# Patient Record
(demographics unavailable — no encounter records)

---

## 2024-10-07 NOTE — ASSESSMENT
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 Patient delivered at [facility] Oct 6, 2020 , upon d/c noted to have elevated BP, followed up with her OB/GYN at [facility] and started on Labetalol which she took for 1 month for treatment of postpartum pre-eclampsia.  TTE 21 with LVEF 15%, mild LAE, severe global LV systolic dysfunction, mod diastolic dysfunction stage II, decreased RV systolic function. Cardiac MRI on  with LVEF 30.55% with no LGE and repeat  with LVEF 41%,  3/24/23 LVEF 40%. Appears euvolemic and normotensive with repeat TTE 4/10/24 LVEF 52% from 40%.

## 2024-10-07 NOTE — CARDIOLOGY SUMMARY
[de-identified] : 4/11/24  NSR 68, RBBB- no change 10/9/23  NSR 68, RBBB- no change 4/3/23 NSR 63, RBBB- no change 10/3/22 NSR 62, RBBB- no change 4/4/22 NSR 79, RBBB 12/6/21 NSR 60, RBBB, NSST 9/2/21 NSR 79, RBBB, NSST 6/3/21 NSR 80, RBBB, NSST   [de-identified] : TTE 4/10/24 with improved LVEF 52%.  3/24/23 TTE; LVEF 40%, no change  12/6/21 TTE; LVEF 44%, LVIDD 4.3 cm, mild to mod LV systolic dysfunction, normal diastolic dysfunction, mild TR  TTE 4/29/21 with LVEF 15%, mild LAE, severe global LV systolic dysfunction, mod diastolic dysfunction stage II, decreased RV systolic function.  12/2020 TTE LVEF 20%, severe LV systolic dysfunction    [de-identified] : 8/9/21 with LVEF 41% with no segmental wall abn or perfusion abn, no LGE to suggest scar, ischemia or fibrosis, RVEF 52%\par  \par  Cardiac MRI on 4//21 with LVEF 30.55% with no LGE \par   \par  \par

## 2024-10-07 NOTE — PHYSICAL EXAM
[Well Nourished] : well nourished [No Acute Distress] : no acute distress [Normal Conjunctiva] : normal conjunctiva [Normal Venous Pressure] : normal venous pressure [Normal S1, S2] : normal S1, S2 [Clear Lung Fields] : clear lung fields [Good Air Entry] : good air entry [Soft] : abdomen soft [Non Tender] : non-tender [Normal Gait] : normal gait [No Edema] : no edema [No Rash] : no rash [Normal] : alert and oriented, normal memory [de-identified] : JVP < 6 cm [de-identified] : warm and well perfused

## 2024-10-07 NOTE — DISCUSSION/SUMMARY
[Patient] : the patient [___ Month(s)] : in [unfilled] month(s) [EKG obtained to assist in diagnosis and management of assessed problem(s)] : EKG obtained to assist in diagnosis and management of assessed problem(s) [FreeTextEntry1] : 1. 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 - Appears euvolemic - Continue Metoprolol  100 mg bid - May take furosemide  to 20 mg as needed for weight gain of 2-3 lbs in 2-3 days from daily- pt has not taken in a long time  - Continue enalapril 20 mg bid . - she will call when she stops breast feeding and will consider changing enalapril to Entresto - Previously Stopped Anticoagulation last visit, no thrombus on TTE - Pt does not require an ICD due to improved LVEF - will remain on current therapy, at least until LVEF normalizes  Repeat Echo April 2025

## 2024-10-07 NOTE — PHYSICAL EXAM
[Well Nourished] : well nourished [No Acute Distress] : no acute distress [Normal Conjunctiva] : normal conjunctiva [Normal Venous Pressure] : normal venous pressure [Normal S1, S2] : normal S1, S2 [Clear Lung Fields] : clear lung fields [Good Air Entry] : good air entry [Soft] : abdomen soft [Non Tender] : non-tender [Normal Gait] : normal gait [No Edema] : no edema [No Rash] : no rash [Normal] : alert and oriented, normal memory [de-identified] : JVP < 6 cm [de-identified] : warm and well perfused

## 2024-10-07 NOTE — HISTORY OF PRESENT ILLNESS
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 Presented to the ED  20 c/o shortness of breath, orthopnea, and palpitations since . Patient delivered at [facility] Oct 6, 2020 , upon d/c noted to have elevated BP, followed up with her OB/GYN at [facility] and started on Labetalol which she took for 1 month for treatment of postpartum pre-eclampsia.  TTE 21 with LVEF 15%, mild LAE, severe global LV systolic dysfunction, mod diastolic dysfunction stage II, decreased RV systolic function. Cardiac MRI on  with LVEF 30.55% with no LGE and TTE 4/10/24 with improved LVEF 52%. Pt is here for a follow up .   Pt is here for a follow up . Her son Randal is 3 years old .and her daughter is 6 years old. TTE 4/10/24 with improved LVEF 52%.   Currently, pt states she is feeling well. Was on enalapril 20 mg bid ^ nursing her 3 year old and had not change to Entresto She is taking metoprolol 100 mg bid and never takes furosemide.    Her walking is not limited by shortness of breath. She can climb 2 flights of stairs without dyspnea. She sleeps with 2 pillows with no orthopnea. No episodes of syncope.   She works n [employer] commuting into [place] two days a week and works remotely three days a week.   Denies chest pain, palpitations, dizziness/LH and syncope.

## 2024-10-07 NOTE — HISTORY OF PRESENT ILLNESS
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 Presented to the ED  20 c/o shortness of breath, orthopnea, and palpitations since . Patient delivered at [facility] Oct 6, 2020 , upon d/c noted to have elevated BP, followed up with her OB/GYN at [facility] and started on Labetalol which she took for 1 month for treatment of postpartum pre-eclampsia.  TTE 21 with LVEF 15%, mild LAE, severe global LV systolic dysfunction, mod diastolic dysfunction stage II, decreased RV systolic function. Cardiac MRI on  with LVEF 30.55% with no LGE and TTE 4/10/24 with improved LVEF 52%. Pt is here for a follow up .   Pt is here for a follow up . Her son Randal is 3 years old .and her daughter is 6 years old. TTE 4/10/24 with improved LVEF 52%.   Currently, pt states she is feeling well. Was on enalapril 20 mg bid ^ nursing her 3 year old and had not change to Entresto She is taking metoprolol 100 mg bid and never takes furosemide.    Her walking is not limited by shortness of breath. She can climb 2 flights of stairs without dyspnea. She sleeps with 2 pillows with no orthopnea. No episodes of syncope.   She works n [employer] commuting into [place] two days a week and works remotely three days a week.   Denies chest pain, palpitations, dizziness/LH and syncope.

## 2024-10-07 NOTE — CARDIOLOGY SUMMARY
[de-identified] : 4/11/24  NSR 68, RBBB- no change 10/9/23  NSR 68, RBBB- no change 4/3/23 NSR 63, RBBB- no change 10/3/22 NSR 62, RBBB- no change 4/4/22 NSR 79, RBBB 12/6/21 NSR 60, RBBB, NSST 9/2/21 NSR 79, RBBB, NSST 6/3/21 NSR 80, RBBB, NSST   [de-identified] : TTE 4/10/24 with improved LVEF 52%.  3/24/23 TTE; LVEF 40%, no change  12/6/21 TTE; LVEF 44%, LVIDD 4.3 cm, mild to mod LV systolic dysfunction, normal diastolic dysfunction, mild TR  TTE 4/29/21 with LVEF 15%, mild LAE, severe global LV systolic dysfunction, mod diastolic dysfunction stage II, decreased RV systolic function.  12/2020 TTE LVEF 20%, severe LV systolic dysfunction    [de-identified] : 8/9/21 with LVEF 41% with no segmental wall abn or perfusion abn, no LGE to suggest scar, ischemia or fibrosis, RVEF 52%\par  \par  Cardiac MRI on 4//21 with LVEF 30.55% with no LGE \par   \par  \par

## 2024-10-07 NOTE — ASSESSMENT
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 Patient delivered at [facility] Oct 6, 2020 , upon d/c noted to have elevated BP, followed up with her OB/GYN at [facility] and started on Labetalol which she took for 1 month for treatment of postpartum pre-eclampsia.  TTE 21 with LVEF 15%, mild LAE, severe global LV systolic dysfunction, mod diastolic dysfunction stage II, decreased RV systolic function. Cardiac MRI on  with LVEF 30.55% with no LGE and repeat  with LVEF 41%,  3/24/23 LVEF 40%. Appears euvolemic and normotensive with repeat TTE 4/10/24 LVEF 52% from 40%.